# Patient Record
Sex: FEMALE | Race: WHITE | NOT HISPANIC OR LATINO | URBAN - METROPOLITAN AREA
[De-identification: names, ages, dates, MRNs, and addresses within clinical notes are randomized per-mention and may not be internally consistent; named-entity substitution may affect disease eponyms.]

---

## 2017-03-30 ENCOUNTER — OUTPATIENT (OUTPATIENT)
Dept: OUTPATIENT SERVICES | Facility: HOSPITAL | Age: 82
LOS: 1 days | End: 2017-03-30
Payer: MEDICARE

## 2017-03-30 VITALS
RESPIRATION RATE: 16 BRPM | DIASTOLIC BLOOD PRESSURE: 82 MMHG | SYSTOLIC BLOOD PRESSURE: 155 MMHG | WEIGHT: 156.97 LBS | HEIGHT: 62.5 IN | HEART RATE: 64 BPM | TEMPERATURE: 98 F | OXYGEN SATURATION: 98 %

## 2017-03-30 DIAGNOSIS — Z98.890 OTHER SPECIFIED POSTPROCEDURAL STATES: Chronic | ICD-10-CM

## 2017-03-30 DIAGNOSIS — Z01.818 ENCOUNTER FOR OTHER PREPROCEDURAL EXAMINATION: ICD-10-CM

## 2017-03-30 DIAGNOSIS — Z95.5 PRESENCE OF CORONARY ANGIOPLASTY IMPLANT AND GRAFT: ICD-10-CM

## 2017-03-30 DIAGNOSIS — N20.1 CALCULUS OF URETER: ICD-10-CM

## 2017-03-30 DIAGNOSIS — Z96.0 PRESENCE OF UROGENITAL IMPLANTS: ICD-10-CM

## 2017-03-30 DIAGNOSIS — Z87.59 PERSONAL HISTORY OF OTHER COMPLICATIONS OF PREGNANCY, CHILDBIRTH AND THE PUERPERIUM: Chronic | ICD-10-CM

## 2017-03-30 DIAGNOSIS — Z90.89 ACQUIRED ABSENCE OF OTHER ORGANS: Chronic | ICD-10-CM

## 2017-03-30 RX ORDER — AMLODIPINE BESYLATE 2.5 MG/1
1 TABLET ORAL
Qty: 0 | Refills: 0 | COMMUNITY

## 2017-03-30 RX ORDER — CIPROFLOXACIN LACTATE 400MG/40ML
400 VIAL (ML) INTRAVENOUS ONCE
Qty: 0 | Refills: 0 | Status: DISCONTINUED | OUTPATIENT
Start: 2017-04-06 | End: 2017-04-21

## 2017-03-30 RX ORDER — SODIUM CHLORIDE 9 MG/ML
3 INJECTION INTRAMUSCULAR; INTRAVENOUS; SUBCUTANEOUS EVERY 8 HOURS
Qty: 0 | Refills: 0 | Status: DISCONTINUED | OUTPATIENT
Start: 2017-04-06 | End: 2017-04-06

## 2017-03-30 RX ORDER — METOPROLOL TARTRATE 50 MG
1 TABLET ORAL
Qty: 0 | Refills: 0 | COMMUNITY

## 2017-03-30 RX ORDER — ATORVASTATIN CALCIUM 80 MG/1
1 TABLET, FILM COATED ORAL
Qty: 0 | Refills: 0 | COMMUNITY

## 2017-03-30 RX ORDER — TICAGRELOR 90 MG/1
1 TABLET ORAL
Qty: 0 | Refills: 0 | COMMUNITY

## 2017-03-30 RX ORDER — ASPIRIN/CALCIUM CARB/MAGNESIUM 324 MG
1 TABLET ORAL
Qty: 0 | Refills: 0 | COMMUNITY

## 2017-03-30 NOTE — H&P PST ADULT - HISTORY OF PRESENT ILLNESS
81 y/o  female with PMH: HTN presents to PST for pre op evaluation with hx  LLQ pain radiating to back on 12/09/16 and vomiting. Pt was seen by PCP who sent her to [facility] ED. S/P US of abd/pelvis, Ct scan  which showed "left kidney stone". S/P left urethral sten placement. Now scheduled for cystoscopy, left ureteroscopy, Holmium laser stent insertion on 12/22/16 81 y/o  female with h/o HTN, HLD, CAD, RAFAEL x 2  in mid LAD ( 12/2016)  and  mid RCA ( 1/31/17 ) on Brilinta and ASA, nephrolithiasis, s/p cystoscopy , left ureteroscopy with Holmium laser and  left ureteral stent placement on  12/22/16, presents to [facility] for scheduled  left stent removal /exchange on 4/6/17. Stent exchange procedure was delayed in the past due to patient undergoing cardiac work up.     Patient followed by cardiologist  Dr. Cristopher Thomson, was instructed to continue Brilinta due to h/o recent cardiac stents, Dr. Thomson spoke with Dr. Jo during last patient's visit ( 3/17/17) . I spoke with Dayana at Dr. Jo's office to confirm that patient can continue Brilinta, she will follow up. Cardiac evaluation note and labs in the chart.     Patient feeling well, denies cardiac symptoms, reports bloody urine since stent placement , no changes. Last urine cx 3/3/17 negative.

## 2017-03-30 NOTE — H&P PST ADULT - NSANTHOSAYNRD_GEN_A_CORE
No. ROBERT screening performed.  STOP BANG Legend: 0-2 = LOW Risk; 3-4 = INTERMEDIATE Risk; 5-8 = HIGH Risk

## 2017-03-30 NOTE — H&P PST ADULT - PMH
Calculus of ureter    Coronary artery disease    Hypertension Borderline diabetes mellitus  3/3/17 Hg A1c 5.9 %  Calculus of ureter    Coronary artery disease    Hypertension    Nephrolithiasis    Presence of stent in coronary artery  RAFAEL in LAD 12/2016 and RCA 1/31/17 on Brilinta and ASA  Retained ureteral stent  inserted  12/22/16

## 2017-03-30 NOTE — H&P PST ADULT - PSH
History of 2  sections  1965, 1969  History of repair of hiatal hernia  16  History of tonsillectomy  as a child History of 2  sections  1965, 1969  History of repair of hiatal hernia  16  History of tonsillectomy  as a child  S/P cystoscopy  left ureteroscopy, Holmium laser stent insertion on 16

## 2017-04-05 ENCOUNTER — RESULT REVIEW (OUTPATIENT)
Age: 82
End: 2017-04-05

## 2017-04-06 ENCOUNTER — OUTPATIENT (OUTPATIENT)
Dept: OUTPATIENT SERVICES | Facility: HOSPITAL | Age: 82
LOS: 1 days | End: 2017-04-06
Payer: MEDICARE

## 2017-04-06 ENCOUNTER — TRANSCRIPTION ENCOUNTER (OUTPATIENT)
Age: 82
End: 2017-04-06

## 2017-04-06 VITALS
OXYGEN SATURATION: 99 % | HEIGHT: 62 IN | TEMPERATURE: 98 F | DIASTOLIC BLOOD PRESSURE: 89 MMHG | SYSTOLIC BLOOD PRESSURE: 165 MMHG | RESPIRATION RATE: 18 BRPM | WEIGHT: 156.97 LBS

## 2017-04-06 VITALS
HEART RATE: 60 BPM | DIASTOLIC BLOOD PRESSURE: 59 MMHG | OXYGEN SATURATION: 95 % | TEMPERATURE: 98 F | RESPIRATION RATE: 16 BRPM | SYSTOLIC BLOOD PRESSURE: 129 MMHG

## 2017-04-06 DIAGNOSIS — Z87.59 PERSONAL HISTORY OF OTHER COMPLICATIONS OF PREGNANCY, CHILDBIRTH AND THE PUERPERIUM: Chronic | ICD-10-CM

## 2017-04-06 DIAGNOSIS — Z98.890 OTHER SPECIFIED POSTPROCEDURAL STATES: Chronic | ICD-10-CM

## 2017-04-06 DIAGNOSIS — N20.1 CALCULUS OF URETER: ICD-10-CM

## 2017-04-06 DIAGNOSIS — Z90.89 ACQUIRED ABSENCE OF OTHER ORGANS: Chronic | ICD-10-CM

## 2017-04-06 PROCEDURE — 82365 CALCULUS SPECTROSCOPY: CPT

## 2017-04-06 PROCEDURE — C1889: CPT

## 2017-04-06 PROCEDURE — 52352 CYSTOURETERO W/STONE REMOVE: CPT | Mod: LT

## 2017-04-06 PROCEDURE — G0463: CPT

## 2017-04-06 PROCEDURE — C1769: CPT

## 2017-04-06 PROCEDURE — C1758: CPT

## 2017-04-06 PROCEDURE — 88300 SURGICAL PATH GROSS: CPT | Mod: 26

## 2017-04-06 PROCEDURE — 88300 SURGICAL PATH GROSS: CPT

## 2017-04-06 PROCEDURE — 52332 CYSTOSCOPY AND TREATMENT: CPT | Mod: LT

## 2017-04-06 PROCEDURE — 76000 FLUOROSCOPY <1 HR PHYS/QHP: CPT

## 2017-04-06 PROCEDURE — C2617: CPT

## 2017-04-06 RX ORDER — SODIUM CHLORIDE 9 MG/ML
1000 INJECTION, SOLUTION INTRAVENOUS
Qty: 0 | Refills: 0 | Status: DISCONTINUED | OUTPATIENT
Start: 2017-04-06 | End: 2017-04-21

## 2017-04-06 RX ORDER — ACETAMINOPHEN 500 MG
2 TABLET ORAL
Qty: 0 | Refills: 0 | COMMUNITY
Start: 2017-04-06

## 2017-04-06 RX ORDER — HYDROMORPHONE HYDROCHLORIDE 2 MG/ML
0.25 INJECTION INTRAMUSCULAR; INTRAVENOUS; SUBCUTANEOUS
Qty: 0 | Refills: 0 | Status: DISCONTINUED | OUTPATIENT
Start: 2017-04-06 | End: 2017-04-06

## 2017-04-06 RX ORDER — MOXIFLOXACIN HYDROCHLORIDE TABLETS, 400 MG 400 MG/1
1 TABLET, FILM COATED ORAL
Qty: 6 | Refills: 0 | OUTPATIENT
Start: 2017-04-06 | End: 2017-04-09

## 2017-04-06 RX ORDER — ONDANSETRON 8 MG/1
4 TABLET, FILM COATED ORAL ONCE
Qty: 0 | Refills: 0 | Status: DISCONTINUED | OUTPATIENT
Start: 2017-04-06 | End: 2017-04-06

## 2017-04-06 RX ORDER — ACETAMINOPHEN 500 MG
650 TABLET ORAL EVERY 6 HOURS
Qty: 0 | Refills: 0 | Status: DISCONTINUED | OUTPATIENT
Start: 2017-04-06 | End: 2017-04-21

## 2017-04-06 RX ADMIN — SODIUM CHLORIDE 100 MILLILITER(S): 9 INJECTION, SOLUTION INTRAVENOUS at 13:52

## 2017-04-06 NOTE — BRIEF OPERATIVE NOTE - PROCEDURE
Ureteroscopy with removal of calculus  04/06/2017    [facility]

## 2017-04-06 NOTE — ASU DISCHARGE PLAN (ADULT/PEDIATRIC). - MEDICATION SUMMARY - MEDICATIONS TO TAKE
I will START or STAY ON the medications listed below when I get home from the hospital:    acetaminophen 325 mg oral tablet  -- 2 tab(s) by mouth every 6 hours, As needed, Mild Pain (1 - 3)  -- Indication: For mild pain    Percocet 5/325 oral tablet  -- 1 tab(s) by mouth every 6 hours, As needed, moderate to severe pain MDD:4 tabs  -- Indication: For moderate to severe pain    aspirin 81 mg oral tablet  -- 1 tab(s) by mouth once a day ( continue )   -- Indication: For home med    losartan 50 mg oral tablet  -- 1 tab(s) by mouth once a day 5pm  -- Indication: For home med    atorvastatin 20 mg oral tablet  -- 1 tab(s) by mouth once a day  -- Indication: For home med    Brilinta (ticagrelor) 90 mg oral tablet  -- 1 tab(s) by mouth 2 times a day ( continue )   -- Indication: For home med    metoprolol succinate 50 mg oral tablet, extended release  -- 1 tab(s) by mouth once a day  -- Indication: For home med    amLODIPine 10 mg oral tablet  -- 1 tab(s) by mouth once a day  -- Indication: For home med    Cipro 500 mg oral tablet  -- 1 tab(s) by mouth every 12 hours  -- Avoid prolonged or excessive exposure to direct and/or artificial sunlight while taking this medication.  Check with your doctor before becoming pregnant.  Do not take dairy products, antacids, or iron preparations within one hour of this medication.  Finish all this medication unless otherwise directed by prescriber.  Medication should be taken with plenty of water.    -- Indication: For post-op antibiotics

## 2017-04-06 NOTE — ASU DISCHARGE PLAN (ADULT/PEDIATRIC). - NOTIFY
Inability to Tolerate Liquids or Foods/Fever greater than 101/Unable to Urinate/Bleeding that does not stop/Persistent Nausea and Vomiting

## 2017-04-10 LAB
COMPN STONE: SIGNIFICANT CHANGE UP
SURGICAL PATHOLOGY STUDY: SIGNIFICANT CHANGE UP

## 2021-08-25 PROBLEM — R73.03 PREDIABETES: Chronic | Status: ACTIVE | Noted: 2017-03-30

## 2021-08-25 PROBLEM — I25.10 ATHEROSCLEROTIC HEART DISEASE OF NATIVE CORONARY ARTERY WITHOUT ANGINA PECTORIS: Chronic | Status: ACTIVE | Noted: 2017-03-30

## 2021-08-25 PROBLEM — Z96.0 PRESENCE OF UROGENITAL IMPLANTS: Chronic | Status: ACTIVE | Noted: 2017-03-30

## 2021-08-25 PROBLEM — N20.0 CALCULUS OF KIDNEY: Chronic | Status: ACTIVE | Noted: 2017-03-30

## 2021-08-25 PROBLEM — Z95.5 PRESENCE OF CORONARY ANGIOPLASTY IMPLANT AND GRAFT: Chronic | Status: ACTIVE | Noted: 2017-03-30

## 2021-09-03 ENCOUNTER — APPOINTMENT (OUTPATIENT)
Dept: RADIATION ONCOLOGY | Facility: CLINIC | Age: 86
End: 2021-09-03
Payer: MEDICARE

## 2021-09-03 DIAGNOSIS — I10 ESSENTIAL (PRIMARY) HYPERTENSION: ICD-10-CM

## 2021-09-03 DIAGNOSIS — Z78.9 OTHER SPECIFIED HEALTH STATUS: ICD-10-CM

## 2021-09-03 DIAGNOSIS — C4A.9 MERKEL CELL CARCINOMA, UNSPECIFIED: ICD-10-CM

## 2021-09-03 DIAGNOSIS — E78.5 HYPERLIPIDEMIA, UNSPECIFIED: ICD-10-CM

## 2021-09-03 DIAGNOSIS — I25.10 ATHEROSCLEROTIC HEART DISEASE OF NATIVE CORONARY ARTERY W/OUT ANGINA PECTORIS: ICD-10-CM

## 2021-09-03 PROCEDURE — 99204 OFFICE O/P NEW MOD 45 MIN: CPT | Mod: GC,25

## 2021-09-03 RX ORDER — AMLODIPINE BESYLATE 10 MG/1
10 TABLET ORAL
Refills: 0 | Status: ACTIVE | COMMUNITY

## 2021-09-03 RX ORDER — METOPROLOL SUCCINATE 50 MG/1
50 TABLET, EXTENDED RELEASE ORAL
Refills: 0 | Status: ACTIVE | COMMUNITY

## 2021-09-03 RX ORDER — ASPIRIN 81 MG
81 TABLET,CHEWABLE ORAL
Refills: 0 | Status: ACTIVE | COMMUNITY

## 2021-09-03 RX ORDER — LOSARTAN POTASSIUM AND HYDROCHLOROTHIAZIDE 25; 100 MG/1; MG/1
100-25 TABLET ORAL
Refills: 0 | Status: ACTIVE | COMMUNITY

## 2021-09-03 RX ORDER — ATORVASTATIN CALCIUM 20 MG/1
20 TABLET, FILM COATED ORAL
Refills: 0 | Status: ACTIVE | COMMUNITY

## 2021-09-06 PROBLEM — C4A.9 MERKEL CELL CARCINOMA: Status: ACTIVE | Noted: 2021-09-06

## 2021-09-06 NOTE — PHYSICAL EXAM
[General Appearance - In No Acute Distress] : in no acute distress [de-identified] : thickened purple lesion right thigh and left thigh [] : no respiratory distress

## 2021-09-06 NOTE — HISTORY OF PRESENT ILLNESS
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 On 7/15/21 had a biopsy done of her mass in the left mid and upper thigh, as well as right mid thigh and these were positive for HHV -8.\par \par The course of her illness began when about 28 years ago she had a lesion of left thigh and per patient it was excised by Dr. Gilmore. She noticed the lesions come back about seven years ago and she never sought any medical care for it until recently two months she saw Dr. Rodriguez, who biopsied it (path not on file).\par \par Today she feels well.  Denies any focal weakness, or changing in sensation, including numbness or tingling of either leg.

## 2021-09-06 NOTE — DISEASE MANAGEMENT
[Pathological] : TNM Stage: p [N/A] : Currently not applicable [FreeTextEntry4] : kaposi's sarcoma  [TTNM] : x [NTNM] : x [MTNM] : x

## 2021-09-09 ENCOUNTER — OUTPATIENT (OUTPATIENT)
Dept: OUTPATIENT SERVICES | Facility: HOSPITAL | Age: 86
LOS: 1 days | Discharge: HOME | End: 2021-09-09
Payer: MEDICARE

## 2021-09-09 DIAGNOSIS — Z87.59 PERSONAL HISTORY OF OTHER COMPLICATIONS OF PREGNANCY, CHILDBIRTH AND THE PUERPERIUM: Chronic | ICD-10-CM

## 2021-09-09 DIAGNOSIS — Z98.890 OTHER SPECIFIED POSTPROCEDURAL STATES: Chronic | ICD-10-CM

## 2021-09-09 DIAGNOSIS — Z90.89 ACQUIRED ABSENCE OF OTHER ORGANS: Chronic | ICD-10-CM

## 2021-09-09 PROCEDURE — 77263 THER RADIOLOGY TX PLNG CPLX: CPT

## 2021-09-10 ENCOUNTER — NON-APPOINTMENT (OUTPATIENT)
Age: 86
End: 2021-09-10

## 2021-09-10 PROCEDURE — 77334 RADIATION TREATMENT AID(S): CPT | Mod: 26

## 2021-09-10 PROCEDURE — 77290 THER RAD SIMULAJ FIELD CPLX: CPT | Mod: 26

## 2021-09-17 PROCEDURE — 77334 RADIATION TREATMENT AID(S): CPT | Mod: 26

## 2021-09-17 PROCEDURE — 77321 SPECIAL TELETX PORT PLAN: CPT | Mod: 26

## 2021-09-23 PROCEDURE — 77280 THER RAD SIMULAJ FIELD SMPL: CPT | Mod: 26

## 2021-10-01 ENCOUNTER — NON-APPOINTMENT (OUTPATIENT)
Age: 86
End: 2021-10-01

## 2021-10-01 PROCEDURE — 77427 RADIATION TX MANAGEMENT X5: CPT

## 2021-10-04 NOTE — HISTORY OF PRESENT ILLNESS
[FreeTextEntry1] : 87 year old female with history of kaposi's sarcoma of the lower extremity, initially diagnosed 28 years ago, with left thigh lesion excised at the time. now presenting with kaposi's sarcoma of bilateral lower extremities.\par Now receiving radiation therapy.\par \par 10/01/2021:Completed 5/5 Fx.\par Minimal erythema.\par Nodular lesions still present

## 2021-10-04 NOTE — DISEASE MANAGEMENT
[Pathological] : TNM Stage: p [TTNM] : x [NTNM] : x [MTNM] : x [N/A] : Currently not applicable [de-identified] : 10/10 fx [de-identified] : Right and Left thigh. [de-identified] : [id number]

## 2021-11-02 ENCOUNTER — APPOINTMENT (OUTPATIENT)
Dept: RADIATION ONCOLOGY | Facility: CLINIC | Age: 86
End: 2021-11-02
Payer: MEDICARE

## 2021-11-02 VITALS
SYSTOLIC BLOOD PRESSURE: 158 MMHG | DIASTOLIC BLOOD PRESSURE: 85 MMHG | RESPIRATION RATE: 18 BRPM | OXYGEN SATURATION: 97 % | TEMPERATURE: 97.52 F | WEIGHT: 156.09 LBS | HEART RATE: 67 BPM

## 2021-11-02 DIAGNOSIS — C46.9 KAPOSI'S SARCOMA, UNSPECIFIED: ICD-10-CM

## 2021-11-02 PROCEDURE — 99024 POSTOP FOLLOW-UP VISIT: CPT | Mod: GC

## 2021-11-08 NOTE — PHYSICAL EXAM
[General Appearance - Well Developed] : well developed [General Appearance - Well Nourished] : well nourished [Sclera] : the sclera and conjunctiva were normal [Extraocular Movements] : extraocular movements were intact [Outer Ear] : the ears and nose were normal in appearance [Hearing Threshold Finger Rub Not [place]] : hearing was normal [] : no respiratory distress [Exaggerated Use Of Accessory Muscles For Inspiration] : no accessory muscle use [Arterial Pulses Normal] : the arterial pulses were normal [Edema] : no peripheral edema present [Nondistended] : nondistended [Nail Clubbing] : no clubbing  or cyanosis of the fingernails [Normal] : oriented to person, place and time, the affect was normal, the mood was normal and not anxious [de-identified] : two small foci subcm on bilat anterior thighs, minimally violaceous

## 2021-11-08 NOTE — HISTORY OF PRESENT ILLNESS
[FreeTextEntry1] : 87 year old female with history of kaposi's sarcoma of the lower extremity, initially diagnosed 28 years ago, with left thigh lesion excised at the time. now with kaposi's sarcoma of bilateral lower extremities.\par \par 10/01/2021:Completed 5/5 Fx. Nodular lesions still present\par \par 11/2/21 - Here for PTE. Doing well, denies desquamation or hyperpigmentation. No concerns.

## 2021-11-08 NOTE — DISEASE MANAGEMENT
[Pathological] : TNM Stage: p [N/A] : Currently not applicable [TTNM] : x [NTNM] : x [MTNM] : x [de-identified] : 10/10 fx [de-identified] : [id number] [de-identified] : Right and Left thigh.

## 2022-01-27 ENCOUNTER — APPOINTMENT (OUTPATIENT)
Dept: RADIATION ONCOLOGY | Facility: CLINIC | Age: 87
End: 2022-01-27
Payer: MEDICARE

## 2022-01-27 VITALS
DIASTOLIC BLOOD PRESSURE: 75 MMHG | TEMPERATURE: 98.6 F | SYSTOLIC BLOOD PRESSURE: 151 MMHG | OXYGEN SATURATION: 97 % | HEART RATE: 61 BPM | WEIGHT: 154.32 LBS | RESPIRATION RATE: 16 BRPM

## 2022-01-27 PROCEDURE — 99212 OFFICE O/P EST SF 10 MIN: CPT

## 2022-02-02 NOTE — HISTORY OF PRESENT ILLNESS
[FreeTextEntry1] : Ms. Boykin is an 87 year old female with Kaposi's sarcoma of the right mid-thigh and the left mid/upper-thigh. She completed RT to b/l thighs to a total dose of 2,000cGy in 5 fractions from 9/27/21 - 10/1/21.\par \par History of Present Illness:\par Approximately 29 years ago she had a left thigh lesion which was excised by Dr. Gilmore. About 8 years ago she noticed the lesions come back and did not seek medical care until approx. July 2021, when she saw Dr. Rodriguez who performed a biopsy. 7/15/21 biopsy b/l thighs: positive for HHV -8.\par \par She presents today for routine follow-up. Today she is feeling well. Denies any pain. She reports no skin changes.\par

## 2022-02-02 NOTE — PHYSICAL EXAM
[Normal] : well developed, well nourished, in no acute distress [de-identified] : improved b/l leg lesions [de-identified] : min hyperpigmentation outlining radiation fields min residual pigmentation where lesions were without residual mass

## 2022-02-02 NOTE — REVIEW OF SYSTEMS
[Constipation: Grade 0] : Constipation: Grade 0 [Diarrhea: Grade 0] : Diarrhea: Grade 0 [Edema Limbs: Grade 0] : Edema Limbs: Grade 0  [Fatigue: Grade 0] : Fatigue: Grade 0 [Dermatitis Radiation: Grade 0] : Dermatitis Radiation: Grade 0 [Skin Hyperpigmentation: Grade 1 - Hyperpigmentation covering <10% BSA; no psychosocial impact] : Skin Hyperpigmentation: Grade 1 - Hyperpigmentation covering <10% BSA; no psychosocial impact

## 2022-08-02 NOTE — REVIEW OF SYSTEMS
[Fatigue] : fatigue [Negative] : Allergic/Immunologic Vermilion Border Text: The closure involved the vermilion border.

## 2025-08-29 ENCOUNTER — EMERGENCY (EMERGENCY)
Facility: HOSPITAL | Age: 89
LOS: 1 days | End: 2025-08-29
Attending: EMERGENCY MEDICINE
Payer: MEDICARE

## 2025-08-29 VITALS
OXYGEN SATURATION: 97 % | RESPIRATION RATE: 19 BRPM | WEIGHT: 136.03 LBS | HEIGHT: 63 IN | SYSTOLIC BLOOD PRESSURE: 128 MMHG | DIASTOLIC BLOOD PRESSURE: 81 MMHG | HEART RATE: 98 BPM | TEMPERATURE: 98 F

## 2025-08-29 VITALS
OXYGEN SATURATION: 98 % | DIASTOLIC BLOOD PRESSURE: 87 MMHG | TEMPERATURE: 98 F | SYSTOLIC BLOOD PRESSURE: 139 MMHG | RESPIRATION RATE: 18 BRPM | HEART RATE: 92 BPM

## 2025-08-29 DIAGNOSIS — Z87.59 PERSONAL HISTORY OF OTHER COMPLICATIONS OF PREGNANCY, CHILDBIRTH AND THE PUERPERIUM: Chronic | ICD-10-CM

## 2025-08-29 DIAGNOSIS — Z98.890 OTHER SPECIFIED POSTPROCEDURAL STATES: Chronic | ICD-10-CM

## 2025-08-29 DIAGNOSIS — Z90.89 ACQUIRED ABSENCE OF OTHER ORGANS: Chronic | ICD-10-CM

## 2025-08-29 PROCEDURE — 12001 RPR S/N/AX/GEN/TRNK 2.5CM/<: CPT

## 2025-08-29 PROCEDURE — 99284 EMERGENCY DEPT VISIT MOD MDM: CPT | Mod: 25

## 2025-08-29 PROCEDURE — 72170 X-RAY EXAM OF PELVIS: CPT

## 2025-08-29 PROCEDURE — 71045 X-RAY EXAM CHEST 1 VIEW: CPT

## 2025-08-29 PROCEDURE — 72170 X-RAY EXAM OF PELVIS: CPT | Mod: 26

## 2025-08-29 PROCEDURE — 72125 CT NECK SPINE W/O DYE: CPT | Mod: 26

## 2025-08-29 PROCEDURE — 90471 IMMUNIZATION ADMIN: CPT

## 2025-08-29 PROCEDURE — 70450 CT HEAD/BRAIN W/O DYE: CPT | Mod: 26

## 2025-08-29 PROCEDURE — 70450 CT HEAD/BRAIN W/O DYE: CPT

## 2025-08-29 PROCEDURE — 71045 X-RAY EXAM CHEST 1 VIEW: CPT | Mod: 26

## 2025-08-29 PROCEDURE — 90715 TDAP VACCINE 7 YRS/> IM: CPT

## 2025-08-29 PROCEDURE — 72125 CT NECK SPINE W/O DYE: CPT

## 2025-08-29 RX ORDER — LIDOCAINE HCL/EPINEPHRINE/PF 1 %-1:200K
10 AMPUL (ML) INJECTION ONCE
Refills: 0 | Status: COMPLETED | OUTPATIENT
Start: 2025-08-29 | End: 2025-08-29

## 2025-08-29 RX ORDER — BACITRACIN 500 UNIT/G
1 OINTMENT (GRAM) TOPICAL ONCE
Refills: 0 | Status: COMPLETED | OUTPATIENT
Start: 2025-08-29 | End: 2025-08-29

## 2025-08-29 RX ORDER — LIDOCAINE HCL/PF 10 MG/ML
10 VIAL (ML) INJECTION ONCE
Refills: 0 | Status: DISCONTINUED | OUTPATIENT
Start: 2025-08-29 | End: 2025-08-29

## 2025-08-29 RX ORDER — ACETAMINOPHEN 500 MG/5ML
975 LIQUID (ML) ORAL ONCE
Refills: 0 | Status: COMPLETED | OUTPATIENT
Start: 2025-08-29 | End: 2025-08-29

## 2025-08-29 RX ADMIN — Medication 1 APPLICATION(S): at 18:25

## 2025-08-29 RX ADMIN — Medication 975 MILLIGRAM(S): at 16:10

## 2025-08-29 RX ADMIN — Medication 10 MILLILITER(S): at 16:28
